# Patient Record
Sex: MALE | URBAN - METROPOLITAN AREA
[De-identification: names, ages, dates, MRNs, and addresses within clinical notes are randomized per-mention and may not be internally consistent; named-entity substitution may affect disease eponyms.]

---

## 2020-11-30 ENCOUNTER — APPOINTMENT (OUTPATIENT)
Dept: URBAN - METROPOLITAN AREA CLINIC 234 | Age: 55
Setting detail: DERMATOLOGY
End: 2020-11-30

## 2020-11-30 DIAGNOSIS — L57.8 OTHER SKIN CHANGES DUE TO CHRONIC EXPOSURE TO NONIONIZING RADIATION: ICD-10-CM

## 2020-11-30 DIAGNOSIS — D18.0 HEMANGIOMA: ICD-10-CM

## 2020-11-30 DIAGNOSIS — L72.0 EPIDERMAL CYST: ICD-10-CM

## 2020-11-30 DIAGNOSIS — L81.4 OTHER MELANIN HYPERPIGMENTATION: ICD-10-CM

## 2020-11-30 DIAGNOSIS — L20.89 OTHER ATOPIC DERMATITIS: ICD-10-CM

## 2020-11-30 DIAGNOSIS — L82.1 OTHER SEBORRHEIC KERATOSIS: ICD-10-CM

## 2020-11-30 PROBLEM — D18.01 HEMANGIOMA OF SKIN AND SUBCUTANEOUS TISSUE: Status: ACTIVE | Noted: 2020-11-30

## 2020-11-30 PROBLEM — L20.84 INTRINSIC (ALLERGIC) ECZEMA: Status: ACTIVE | Noted: 2020-11-30

## 2020-11-30 PROCEDURE — 99213 OFFICE O/P EST LOW 20 MIN: CPT

## 2020-11-30 PROCEDURE — OTHER COUNSELING: OTHER

## 2020-11-30 ASSESSMENT — LOCATION SIMPLE DESCRIPTION DERM
LOCATION SIMPLE: RIGHT UPPER BACK
LOCATION SIMPLE: ABDOMEN
LOCATION SIMPLE: UPPER BACK
LOCATION SIMPLE: LEFT UPPER BACK
LOCATION SIMPLE: CHEST

## 2020-11-30 ASSESSMENT — LOCATION DETAILED DESCRIPTION DERM
LOCATION DETAILED: RIGHT MEDIAL INFERIOR CHEST
LOCATION DETAILED: LEFT MEDIAL SUPERIOR CHEST
LOCATION DETAILED: INFERIOR THORACIC SPINE
LOCATION DETAILED: RIGHT SUPERIOR MEDIAL UPPER BACK
LOCATION DETAILED: STERNUM
LOCATION DETAILED: LEFT SUPERIOR UPPER BACK
LOCATION DETAILED: PERIUMBILICAL SKIN

## 2020-11-30 ASSESSMENT — LOCATION ZONE DERM: LOCATION ZONE: TRUNK

## 2023-02-27 ENCOUNTER — APPOINTMENT (OUTPATIENT)
Dept: URBAN - METROPOLITAN AREA CLINIC 234 | Age: 58
Setting detail: DERMATOLOGY
End: 2023-02-27

## 2023-02-27 DIAGNOSIS — L72.0 EPIDERMAL CYST: ICD-10-CM

## 2023-02-27 PROCEDURE — 10060 I&D ABSCESS SIMPLE/SINGLE: CPT

## 2023-02-27 PROCEDURE — OTHER INCISION AND DRAINAGE: OTHER

## 2023-02-27 PROCEDURE — OTHER COUNSELING: OTHER

## 2023-02-27 PROCEDURE — OTHER MIPS QUALITY: OTHER

## 2023-02-27 ASSESSMENT — LOCATION SIMPLE DESCRIPTION DERM: LOCATION SIMPLE: CHEST

## 2023-02-27 ASSESSMENT — LOCATION DETAILED DESCRIPTION DERM: LOCATION DETAILED: RIGHT MEDIAL INFERIOR CHEST

## 2023-02-27 ASSESSMENT — LOCATION ZONE DERM: LOCATION ZONE: TRUNK

## 2023-02-27 NOTE — PROCEDURE: INCISION AND DRAINAGE
Render Postcare In Note?: No
Wound Care: Petrolatum
Size Of Lesion In Cm (Optional But May Be Required For Some Insurances): 0
Consent was obtained and risks were reviewed including but not limited to delayed wound healing, infection, need for multiple I and D's, and pain.
Post-Care Instructions: I reviewed with the patient in detail post-care instructions. Patient should keep wound covered and call the office should any redness, pain, swelling or worsening occur.
Anesthesia Type: 1% lidocaine with epinephrine
Epidermal Closure: simple interrupted
Epidermal Sutures: 4-0 Ethilon
Dressing: dry sterile dressing
Suture Text: The incision was partially closed with
Detail Level: Detailed
Method: 11 blade
Curette: Yes
Curette Text (Optional): After the contents were expressed a curette was used to partially remove the cyst wall.
Lesion Type: Abscess
Preparation Text: The area was prepped in the usual clean fashion.